# Patient Record
Sex: MALE | Race: WHITE | Employment: FULL TIME | URBAN - METROPOLITAN AREA
[De-identification: names, ages, dates, MRNs, and addresses within clinical notes are randomized per-mention and may not be internally consistent; named-entity substitution may affect disease eponyms.]

---

## 2019-08-21 ENCOUNTER — APPOINTMENT (OUTPATIENT)
Dept: GENERAL RADIOLOGY | Age: 24
End: 2019-08-21
Payer: COMMERCIAL

## 2019-08-21 ENCOUNTER — HOSPITAL ENCOUNTER (EMERGENCY)
Age: 24
Discharge: HOME | End: 2019-08-21
Attending: EMERGENCY MEDICINE
Payer: COMMERCIAL

## 2019-08-21 VITALS
SYSTOLIC BLOOD PRESSURE: 102 MMHG | OXYGEN SATURATION: 98 % | HEART RATE: 50 BPM | BODY MASS INDEX: 18.65 KG/M2 | WEIGHT: 126.3 LBS | DIASTOLIC BLOOD PRESSURE: 54 MMHG | TEMPERATURE: 98.2 F | RESPIRATION RATE: 16 BRPM

## 2019-08-21 DIAGNOSIS — R19.7 NAUSEA VOMITING AND DIARRHEA: Primary | ICD-10-CM

## 2019-08-21 DIAGNOSIS — R11.2 NAUSEA VOMITING AND DIARRHEA: Primary | ICD-10-CM

## 2019-08-21 DIAGNOSIS — S46.811A STRAIN OF RIGHT TRAPEZIUS MUSCLE, INITIAL ENCOUNTER: ICD-10-CM

## 2019-08-21 LAB
A/G RATIO: 1.9 (ref 1.1–2.2)
ALBUMIN SERPL-MCNC: 4.9 G/DL (ref 3.4–5)
ALP BLD-CCNC: 87 U/L (ref 40–129)
ALT SERPL-CCNC: 16 U/L (ref 10–40)
AMORPHOUS: ABNORMAL /HPF
ANION GAP SERPL CALCULATED.3IONS-SCNC: 10 MMOL/L (ref 3–16)
AST SERPL-CCNC: 16 U/L (ref 15–37)
BASOPHILS ABSOLUTE: 0.1 K/UL (ref 0–0.2)
BASOPHILS RELATIVE PERCENT: 1.2 %
BILIRUB SERPL-MCNC: 1 MG/DL (ref 0–1)
BILIRUBIN URINE: NEGATIVE
BLOOD, URINE: NEGATIVE
BUN BLDV-MCNC: 10 MG/DL (ref 7–20)
CALCIUM SERPL-MCNC: 9.5 MG/DL (ref 8.3–10.6)
CHLORIDE BLD-SCNC: 104 MMOL/L (ref 99–110)
CLARITY: CLEAR
CO2: 27 MMOL/L (ref 21–32)
COLOR: YELLOW
CREAT SERPL-MCNC: 0.7 MG/DL (ref 0.9–1.3)
EOSINOPHILS ABSOLUTE: 0.4 K/UL (ref 0–0.6)
EOSINOPHILS RELATIVE PERCENT: 5.5 %
EPITHELIAL CELLS, UA: ABNORMAL /HPF
GFR AFRICAN AMERICAN: >60
GFR NON-AFRICAN AMERICAN: >60
GLOBULIN: 2.6 G/DL
GLUCOSE BLD-MCNC: 111 MG/DL (ref 70–99)
GLUCOSE URINE: NEGATIVE MG/DL
HCT VFR BLD CALC: 44.8 % (ref 40.5–52.5)
HEMOGLOBIN: 15.2 G/DL (ref 13.5–17.5)
KETONES, URINE: NEGATIVE MG/DL
LEUKOCYTE ESTERASE, URINE: NEGATIVE
LIPASE: 15 U/L (ref 13–60)
LYMPHOCYTES ABSOLUTE: 1.3 K/UL (ref 1–5.1)
LYMPHOCYTES RELATIVE PERCENT: 18.7 %
MCH RBC QN AUTO: 29.1 PG (ref 26–34)
MCHC RBC AUTO-ENTMCNC: 33.8 G/DL (ref 31–36)
MCV RBC AUTO: 86.2 FL (ref 80–100)
MICROSCOPIC EXAMINATION: YES
MONOCYTES ABSOLUTE: 0.5 K/UL (ref 0–1.3)
MONOCYTES RELATIVE PERCENT: 7 %
MUCUS: ABNORMAL /LPF
NEUTROPHILS ABSOLUTE: 4.9 K/UL (ref 1.7–7.7)
NEUTROPHILS RELATIVE PERCENT: 67.6 %
NITRITE, URINE: NEGATIVE
PDW BLD-RTO: 13.6 % (ref 12.4–15.4)
PH UA: 7.5 (ref 5–8)
PLATELET # BLD: 168 K/UL (ref 135–450)
PMV BLD AUTO: 8.1 FL (ref 5–10.5)
POTASSIUM REFLEX MAGNESIUM: 4.1 MMOL/L (ref 3.5–5.1)
PROTEIN UA: ABNORMAL MG/DL
RBC # BLD: 5.2 M/UL (ref 4.2–5.9)
RBC UA: ABNORMAL /HPF (ref 0–2)
SODIUM BLD-SCNC: 141 MMOL/L (ref 136–145)
SPECIFIC GRAVITY UA: 1.01 (ref 1–1.03)
TOTAL PROTEIN: 7.5 G/DL (ref 6.4–8.2)
URINE TYPE: ABNORMAL
UROBILINOGEN, URINE: 2 E.U./DL
WBC # BLD: 7.2 K/UL (ref 4–11)
WBC UA: ABNORMAL /HPF (ref 0–5)

## 2019-08-21 PROCEDURE — 85025 COMPLETE CBC W/AUTO DIFF WBC: CPT

## 2019-08-21 PROCEDURE — 83690 ASSAY OF LIPASE: CPT

## 2019-08-21 PROCEDURE — 99284 EMERGENCY DEPT VISIT MOD MDM: CPT

## 2019-08-21 PROCEDURE — 73030 X-RAY EXAM OF SHOULDER: CPT

## 2019-08-21 PROCEDURE — 80053 COMPREHEN METABOLIC PANEL: CPT

## 2019-08-21 PROCEDURE — 6360000002 HC RX W HCPCS: Performed by: EMERGENCY MEDICINE

## 2019-08-21 PROCEDURE — 96361 HYDRATE IV INFUSION ADD-ON: CPT

## 2019-08-21 PROCEDURE — 96374 THER/PROPH/DIAG INJ IV PUSH: CPT

## 2019-08-21 PROCEDURE — 2580000003 HC RX 258: Performed by: EMERGENCY MEDICINE

## 2019-08-21 PROCEDURE — 81001 URINALYSIS AUTO W/SCOPE: CPT

## 2019-08-21 RX ORDER — METHOCARBAMOL 750 MG/1
750 TABLET, FILM COATED ORAL 3 TIMES DAILY PRN
Qty: 30 TABLET | Refills: 0 | Status: SHIPPED | OUTPATIENT
Start: 2019-08-21 | End: 2019-08-31

## 2019-08-21 RX ORDER — OMEGA-3 FATTY ACIDS/FISH OIL 300-1000MG
3 CAPSULE ORAL PRN
COMMUNITY
End: 2021-09-01

## 2019-08-21 RX ORDER — 0.9 % SODIUM CHLORIDE 0.9 %
1000 INTRAVENOUS SOLUTION INTRAVENOUS ONCE
Status: COMPLETED | OUTPATIENT
Start: 2019-08-21 | End: 2019-08-21

## 2019-08-21 RX ORDER — ONDANSETRON 4 MG/1
4 TABLET, ORALLY DISINTEGRATING ORAL EVERY 8 HOURS PRN
Qty: 12 TABLET | Refills: 1 | Status: SHIPPED | OUTPATIENT
Start: 2019-08-21 | End: 2021-04-14

## 2019-08-21 RX ORDER — ONDANSETRON 2 MG/ML
4 INJECTION INTRAMUSCULAR; INTRAVENOUS ONCE
Status: COMPLETED | OUTPATIENT
Start: 2019-08-21 | End: 2019-08-21

## 2019-08-21 RX ORDER — METHYLPREDNISOLONE 4 MG/1
TABLET ORAL
Qty: 1 KIT | Refills: 0 | Status: SHIPPED | OUTPATIENT
Start: 2019-08-21 | End: 2021-04-14

## 2019-08-21 RX ADMIN — SODIUM CHLORIDE 1000 ML: 9 INJECTION, SOLUTION INTRAVENOUS at 12:07

## 2019-08-21 RX ADMIN — ONDANSETRON 4 MG: 2 INJECTION INTRAMUSCULAR; INTRAVENOUS at 12:07

## 2019-08-21 ASSESSMENT — PAIN DESCRIPTION - PAIN TYPE: TYPE: ACUTE PAIN

## 2019-08-21 ASSESSMENT — PAIN SCALES - GENERAL: PAINLEVEL_OUTOF10: 6

## 2019-08-21 NOTE — ED PROVIDER NOTES
within normal limits    Narrative:     Performed at:  [facility]) - [facility]  [address],  [place]   Phone [phone]   CBC WITH AUTO DIFFERENTIAL    Narrative:     Performed at:  [facility]  [address],  [place]   Phone [phone]   LIPASE    Narrative:     Performed at:  [facility] - [facility]  [address],  [place]   Phone [phone]       ED COURSE & MEDICAL DECISION MAKING:  Pertinent Labs & Imaging studies reviewed. (See chart for details)  61-year-old male with 3-day history of nausea as well as some transient vomiting and diarrhea states his appetite is been decreased and he is worried that he is dehydrated. He works outside in the heat doing paving. Denies abdominal pain but does feel bloated at times. No urinary symptoms. He also complains of some right upper back pain for the last month it radiates into his right shoulder and sometimes into his neck. No known injury. IV fluids were started. He was given Zofran for nausea. CBC was normal.  CMP and lipase were normal.  Urinalysis was normal.  On reexam the patient is comfortable. Ambulates normally. No dizziness. Likely this is a viral illness. He was given a prescription for Zofran to take if needed for nausea and advised to push fluids. X-rays of the right shoulder were read as negative by the radiologist on duty and reviewed by myself. Clinically he has trapezius muscle pain. Less likely this represents a cervical radiculopathy but the patient is neurologically intact with no consistent radiation of the pain into the arm. I recommended ice to the area especially after working. He was given a Medrol Dosepak and Robaxin for muscle tightness. Recommended Tylenol if needed for pain. Advise follow-up with the [facility]. medical clinic.         I discussed with

## 2021-04-14 ENCOUNTER — HOSPITAL ENCOUNTER (EMERGENCY)
Age: 26
Discharge: HOME | End: 2021-04-15
Attending: EMERGENCY MEDICINE

## 2021-04-14 DIAGNOSIS — K04.7 DENTAL INFECTION: ICD-10-CM

## 2021-04-14 DIAGNOSIS — K02.9 DENTAL CAVITY: Primary | ICD-10-CM

## 2021-04-14 DIAGNOSIS — N50.812 LEFT TESTICULAR PAIN: ICD-10-CM

## 2021-04-14 LAB
ANION GAP SERPL CALCULATED.3IONS-SCNC: 8 MMOL/L (ref 3–16)
BASOPHILS ABSOLUTE: 0.1 K/UL (ref 0–0.2)
BASOPHILS RELATIVE PERCENT: 1.4 %
BILIRUBIN URINE: NEGATIVE
BLOOD, URINE: NEGATIVE
BUN BLDV-MCNC: 11 MG/DL (ref 7–20)
CALCIUM SERPL-MCNC: 9.2 MG/DL (ref 8.3–10.6)
CHLORIDE BLD-SCNC: 106 MMOL/L (ref 99–110)
CLARITY: CLEAR
CO2: 27 MMOL/L (ref 21–32)
COLOR: YELLOW
CREAT SERPL-MCNC: 0.8 MG/DL (ref 0.9–1.3)
EOSINOPHILS ABSOLUTE: 0.5 K/UL (ref 0–0.6)
EOSINOPHILS RELATIVE PERCENT: 7.1 %
GFR AFRICAN AMERICAN: >60
GFR NON-AFRICAN AMERICAN: >60
GLUCOSE BLD-MCNC: 113 MG/DL (ref 70–99)
GLUCOSE URINE: NEGATIVE MG/DL
HCT VFR BLD CALC: 41.3 % (ref 40.5–52.5)
HEMOGLOBIN: 14.1 G/DL (ref 13.5–17.5)
KETONES, URINE: NEGATIVE MG/DL
LEUKOCYTE ESTERASE, URINE: NEGATIVE
LYMPHOCYTES ABSOLUTE: 2.8 K/UL (ref 1–5.1)
LYMPHOCYTES RELATIVE PERCENT: 37.2 %
MCH RBC QN AUTO: 29.9 PG (ref 26–34)
MCHC RBC AUTO-ENTMCNC: 34.2 G/DL (ref 31–36)
MCV RBC AUTO: 87.5 FL (ref 80–100)
MICROSCOPIC EXAMINATION: NORMAL
MONOCYTES ABSOLUTE: 0.5 K/UL (ref 0–1.3)
MONOCYTES RELATIVE PERCENT: 6.5 %
NEUTROPHILS ABSOLUTE: 3.6 K/UL (ref 1.7–7.7)
NEUTROPHILS RELATIVE PERCENT: 47.8 %
NITRITE, URINE: NEGATIVE
PDW BLD-RTO: 13.1 % (ref 12.4–15.4)
PH UA: 6 (ref 5–8)
PLATELET # BLD: 168 K/UL (ref 135–450)
PMV BLD AUTO: 7.9 FL (ref 5–10.5)
POTASSIUM REFLEX MAGNESIUM: 3.8 MMOL/L (ref 3.5–5.1)
PROTEIN UA: NEGATIVE MG/DL
RBC # BLD: 4.72 M/UL (ref 4.2–5.9)
SODIUM BLD-SCNC: 141 MMOL/L (ref 136–145)
SPECIFIC GRAVITY UA: >=1.03 (ref 1–1.03)
URINE REFLEX TO CULTURE: NORMAL
URINE TRICHOMONAS EVALUATION: NORMAL
URINE TYPE: NORMAL
UROBILINOGEN, URINE: 1 E.U./DL
WBC # BLD: 7.6 K/UL (ref 4–11)

## 2021-04-14 PROCEDURE — 87591 N.GONORRHOEAE DNA AMP PROB: CPT

## 2021-04-14 PROCEDURE — 81001 URINALYSIS AUTO W/SCOPE: CPT

## 2021-04-14 PROCEDURE — 6360000002 HC RX W HCPCS: Performed by: EMERGENCY MEDICINE

## 2021-04-14 PROCEDURE — 87491 CHLMYD TRACH DNA AMP PROBE: CPT

## 2021-04-14 PROCEDURE — 99283 EMERGENCY DEPT VISIT LOW MDM: CPT

## 2021-04-14 PROCEDURE — 6370000000 HC RX 637 (ALT 250 FOR IP): Performed by: EMERGENCY MEDICINE

## 2021-04-14 PROCEDURE — 80048 BASIC METABOLIC PNL TOTAL CA: CPT

## 2021-04-14 PROCEDURE — 85025 COMPLETE CBC W/AUTO DIFF WBC: CPT

## 2021-04-14 PROCEDURE — 96372 THER/PROPH/DIAG INJ SC/IM: CPT

## 2021-04-14 RX ORDER — ONDANSETRON 4 MG/1
4 TABLET, ORALLY DISINTEGRATING ORAL ONCE
Status: COMPLETED | OUTPATIENT
Start: 2021-04-14 | End: 2021-04-14

## 2021-04-14 RX ORDER — KETOROLAC TROMETHAMINE 30 MG/ML
30 INJECTION, SOLUTION INTRAMUSCULAR; INTRAVENOUS ONCE
Status: COMPLETED | OUTPATIENT
Start: 2021-04-14 | End: 2021-04-14

## 2021-04-14 RX ORDER — CLINDAMYCIN HYDROCHLORIDE 300 MG/1
300 CAPSULE ORAL 4 TIMES DAILY
Qty: 28 CAPSULE | Refills: 0 | Status: SHIPPED | OUTPATIENT
Start: 2021-04-14 | End: 2021-04-21

## 2021-04-14 RX ADMIN — ONDANSETRON 4 MG: 4 TABLET, ORALLY DISINTEGRATING ORAL at 21:44

## 2021-04-14 RX ADMIN — KETOROLAC TROMETHAMINE 30 MG: 30 INJECTION, SOLUTION INTRAMUSCULAR at 21:44

## 2021-04-14 ASSESSMENT — PAIN SCALES - GENERAL: PAINLEVEL_OUTOF10: 7

## 2021-04-14 ASSESSMENT — ENCOUNTER SYMPTOMS
ABDOMINAL PAIN: 1
BACK PAIN: 1
EYE ITCHING: 0
EYES NEGATIVE: 1
NAUSEA: 1
DIARRHEA: 0
RESPIRATORY NEGATIVE: 1
VOMITING: 0
RHINORRHEA: 0
CONSTIPATION: 0
SHORTNESS OF BREATH: 0

## 2021-04-14 ASSESSMENT — PAIN DESCRIPTION - DESCRIPTORS: DESCRIPTORS: STABBING;ACHING

## 2021-04-14 NOTE — LETTER
[facility] Emergency Department  [facility] [address] [address]  Phone: [phone]  Fax: [phone]               April 15, 2021    Patient: Regan Jay   YOB: 1995   Date of Visit: 4/14/2021       To Whom It May Concern:    Lisa Dawkins was seen and treated in our emergency department on 4/14/2021. He may return to work on 4/16/21.       Sincerely,       Katy Collazo MD         Signature:__________________________________

## 2021-04-15 ENCOUNTER — APPOINTMENT (OUTPATIENT)
Dept: ULTRASOUND IMAGING | Age: 26
End: 2021-04-15

## 2021-04-15 VITALS
HEART RATE: 72 BPM | TEMPERATURE: 98.2 F | OXYGEN SATURATION: 100 % | WEIGHT: 139.1 LBS | RESPIRATION RATE: 14 BRPM | SYSTOLIC BLOOD PRESSURE: 111 MMHG | HEIGHT: 69 IN | BODY MASS INDEX: 20.6 KG/M2 | DIASTOLIC BLOOD PRESSURE: 50 MMHG

## 2021-04-15 LAB
C. TRACHOMATIS DNA ,URINE: NEGATIVE
N. GONORRHOEAE DNA, URINE: NEGATIVE

## 2021-04-15 PROCEDURE — 93975 VASCULAR STUDY: CPT

## 2021-04-15 PROCEDURE — 76870 US EXAM SCROTUM: CPT

## 2021-04-15 NOTE — ED NOTES
Pt to [facility] ED per pvt car informed to remain NPO and go directly there.      Vicki Mckeon RN  04/14/21 2430

## 2021-04-15 NOTE — ED NOTES
Patient discharged to home via family. Written discharge instructions reviewed with understanding. Copy of AVS and signed prescription sent home with patient. Patient able to walk from ED without assistance.        Charlotte López RN  04/15/21 2505

## 2021-04-15 NOTE — ED PROVIDER NOTES
[address]      Pt Name: Garry Carolina  MRN: [id number]  [place] 1995  Date of evaluation: 4/14/2021  Provider: Huang Lundberg MD    CHIEF COMPLAINT       Chief Complaint   Patient presents with    Abdominal Pain     stomach pain left lower abd  ( has testicle \"L\" issues) nausea in mornings         HISTORY OF PRESENT ILLNESS   (Location/Symptom, Timing/Onset,Context/Setting, Quality, Duration, Modifying Factors, Severity)  Note limiting factors. Garry Carolina is a 26 y.o. male who presents to the emergency department for sharp left lower quadrant abdominal pain and also left testicular pain. His symptoms have been ongoing for several years intermittently however over the past few weeks his symptoms have worsened resulting in severe left testicular pain today. When the pain gets severe it is associated of nausea although he is not vomited. Patient also complaining of dental pain. Nursing notes were reviewed. REVIEW OF SYSTEMS    (2-9 systems for level 4, 10 or more for level 5)     Review of Systems   Constitutional:         temp   HENT: Positive for dental problem. Negative for rhinorrhea. Eyes: Negative for itching. Respiratory: Negative for shortness of breath. Cardiovascular: Negative for chest pain. Gastrointestinal: Positive for abdominal pain. Genitourinary: Positive for frequency and testicular pain. Negative for discharge, dysuria and hematuria. Incomplete voiding   Musculoskeletal: Positive for back pain. Skin: Negative for rash. Neurological: Negative for headaches. Hematological: Does not bruise/bleed easily. PAST MEDICAL HISTORY   History reviewed. No pertinent past medical history.       SURGICALHISTORY       Past Surgical History:   Procedure Laterality Date    FRACTURE SURGERY      nasal fracture    HAND SURGERY      left    WISDOM TOOTH EXTRACTION           CURRENT MEDICATIONS       Previous Ear: External ear normal.      Left Ear: External ear normal.      Nose: Nose normal.      Mouth/Throat:      Comments: Left upper molar indicating surrounding tenderness. No obvious abscess collection. The gum is slightly edematous. Eyes:      General: No scleral icterus. Right eye: No discharge. Left eye: No discharge. Conjunctiva/sclera: Conjunctivae normal.   Neck:      Musculoskeletal: Neck supple. Cardiovascular:      Rate and Rhythm: Normal rate and regular rhythm. Heart sounds: Normal heart sounds. Pulmonary:      Effort: Pulmonary effort is normal. No respiratory distress. Breath sounds: Normal breath sounds. No wheezing or rales. Abdominal:      General: Bowel sounds are normal.      Hernia: No hernia is present. Genitourinary:     Comments: Left testicular tenderness, tenderness along the spermatic cord, no obvious hernias, no obvious swelling. Slightly diminished cremasterics reflex on the left when compared to the right. No obvious abnormality of the testicle. Skin:     Coloration: Skin is not pale. Neurological:      Mental Status: He is alert.    Psychiatric:         Mood and Affect: Mood normal.         Behavior: Behavior normal.             DIAGNOSTIC RESULTS     EKG: All EKG's are interpreted by the Emergency Department Physician who either signs or Co-signs this chart in the absence of a cardiologist.    12 lead EKG shows   RADIOLOGY:   Non-plain film images such as CT, Ultrasound and MRI are read by the radiologist. Plain radiographic images are visualized and preliminarily interpreted by the emergency physician with the below findings:        Interpretation per the Radiologist below, if available at the time of this note:    [address]    (Results Pending)         ED BEDSIDE ULTRASOUND:   Performed by ED Physician - none    LABS:  Labs Reviewed   BASIC METABOLIC PANEL W/ REFLEX TO MG FOR LOW K - Abnormal; Notable for the following components:       Result Value    Glucose 113 (*)     CREATININE 0.8 (*)     All other components within normal limits    Narrative:     Performed at:  [facility]  [address],  [place] [facility]   Phone [phone]   C. TRACHOMATIS N.GONORRHOEAE DNA, URINE   CBC WITH AUTO DIFFERENTIAL    Narrative:     Performed at:  [facility]  [facility]  [place] [address] [facility]   Phone [phone]   URINE RT REFLEX TO CULTURE    Narrative:     Performed at:  [facility]  [facility],  [place] [facility]   Phone [phone]   URINE TRICHOMONAS EVALUATION    Narrative:     Performed at:  [facility]  [address] [facility]   Phone [phone]       All other labs were within normal range or not returned as of this dictation. EMERGENCY DEPARTMENT COURSE and DIFFERENTIAL DIAGNOSIS/MDM:   Vitals:    Vitals:    04/14/21 2035   BP: (!) 111/50   Resp: 14   Temp: 98.2 °F (36.8 °C)   TempSrc: Oral   SpO2: 100%   Weight: 139 lb 1.6 oz (63.1 kg)   Height: 5' 9\" (1.753 m)       Adult male who comes in for left lower quadrant abdominal pain however on exam the patient does not have any abdominal tenderness no CVA tenderness he is pain is localized to the left spermatic cord testicle epididymal region. No obvious swelling or deformity. No redness. Patient has had issues with his testicle in the past.  I reviewed the patient's chart he has had 2 nonspecific ultrasounds one was normal and one had what appeared to be calcification. Basic laboratory studies are obtained here. I believe that the patient requires an ultrasound of his testicle. I spoke to [facility] and Dr. Vinayak Wren has agreed to accept this patient. Counseling is provided the patient will go via private vehicle.   He is given medication for pain here prior to discharge. A prescription for antibiotics prescribed for his dental pain and I recommend that he follows up with a dentist.               CRITICAL CARE TIME   None       CONSULTS:  None    PROCEDURES:       Procedures    FINAL IMPRESSION      1. Dental cavity    2. Left testicular pain    3. Dental infection          DISPOSITION/PLAN   DISPOSITION Decision To Transfer 04/14/2021 09:31:37 PM      PATIENT REFERREDTO:  No follow-up provider specified.     DISCHARGEMEDICATIONS:  New Prescriptions    CLINDAMYCIN (CLEOCIN) 300 MG CAPSULE    Take 1 capsule by mouth 4 times daily for 7 days          (Please note that portions of this note were completed with a voice recognition program.  Efforts were made to edit the dictations but occasionally words are mis-transcribed.)    Dragan Denton MD (electronically signed)  Attending Emergency Physician        Dragan Denton MD  04/14/21 0930

## 2021-04-15 NOTE — ED NOTES
Report called to [facility] ED RN CN and informed pt will arrive by pvt care.       Elizabeth Lowery RN  04/14/21 2747

## 2021-04-15 NOTE — ED PROVIDER NOTES
used smokeless tobacco. He reports that he does not drink alcohol or use drugs. Medications     Previous Medications    IBUPROFEN (ADVIL) 200 MG CAPS    Take 3 capsules by mouth as needed for Fever       Allergies     He is allergic to penicillins. Physical Exam     INITIAL VITALS: BP: (!) 111/50, Temp: 98.2 °F (36.8 °C),  , Resp: 14, SpO2: 100 %   Physical Exam  Vitals signs and nursing note reviewed. Constitutional:       General: He is not in acute distress. Abdominal:      General: Bowel sounds are normal.      Palpations: Abdomen is soft. Tenderness: There is no abdominal tenderness. There is no guarding or rebound. Hernia: There is no hernia in the left inguinal area. Genitourinary:     Testes:         Left: Tenderness present. Swelling not present. Cremasteric reflex is present. Epididymis:      Left: Normal.      Comments: Tenderness to palpation of the left testicle which is normal in lie. No epididymal swelling or tenderness noted. Lymphadenopathy:      Lower Body: No left inguinal adenopathy. Neurological:      Mental Status: He is alert. Diagnostic Results     RADIOLOGY:  US SCROTUM AND TESTICLES   Final Result     Normal scrotal ultrasound. No torsion. No intratesticular mass. US DUP ABD PEL RETRO SCROT COMPLETE   Final Result   Normal scrotal ultrasound. No torsion. No intratesticular mass.           LABS:   Results for orders placed or performed during the hospital encounter of 04/14/21   CBC Auto Differential   Result Value Ref Range    WBC 7.6 4.0 - 11.0 K/uL    RBC 4.72 4.20 - 5.90 M/uL    Hemoglobin 14.1 13.5 - 17.5 g/dL    Hematocrit 41.3 40.5 - 52.5 %    MCV 87.5 80.0 - 100.0 fL    MCH 29.9 26.0 - 34.0 pg    MCHC 34.2 31.0 - 36.0 g/dL    RDW 13.1 12.4 - 15.4 %    Platelets 663 912 - 481 K/uL    MPV 7.9 5.0 - 10.5 fL    Neutrophils % 47.8 %    Lymphocytes % 37.2 %    Monocytes % 6.5 %    Eosinophils % 7.1 %    Basophils % 1.4 %    Neutrophils DECISIONMAKING / ASSESSMENT / PLAN     Edmundo Choe is a 26 y.o. male presents complaining of abdominal pain and testicular pain. Patient reports testicular pain that has been chronic in nature though worse over the last week. He does have left testicular tenderness on exam.  Patient was seen initially at an outside hospital and transferred here for evaluation. Laboratory studies to the outside hospital were unremarkable. Ultrasound here shows no evidence of torsion or other abnormalities. Patient will be instructed do supportive care for pain and follow-up with urology. He was written for antibiotics at the outside facility and will be instructed to continue this and follow-up with a dental clinic of his choice. Clinical Impression     1. Dental cavity    2. Left testicular pain    3.  Dental infection        Disposition     PATIENT REFERRED TO:  Primary care provider of your choice          [facility]          Titi Young MD  [place] [address]  [facility] [address] [place] [address]  [phone]            DISCHARGE MEDICATIONS:  New Prescriptions    CLINDAMYCIN (CLEOCIN) 300 MG CAPSULE    Take 1 capsule by mouth 4 times daily for 7 days       DISPOSITION Decision To Discharge 04/15/2021 12:41:11 AM        Chela Menendez MD  04/15/21 0849

## 2021-09-01 ENCOUNTER — HOSPITAL ENCOUNTER (EMERGENCY)
Age: 26
Discharge: HOME | End: 2021-09-01
Attending: EMERGENCY MEDICINE

## 2021-09-01 VITALS
BODY MASS INDEX: 19.28 KG/M2 | SYSTOLIC BLOOD PRESSURE: 109 MMHG | TEMPERATURE: 97.8 F | WEIGHT: 130.2 LBS | RESPIRATION RATE: 12 BRPM | HEIGHT: 69 IN | HEART RATE: 56 BPM | OXYGEN SATURATION: 98 % | DIASTOLIC BLOOD PRESSURE: 54 MMHG

## 2021-09-01 DIAGNOSIS — K08.89 PAIN, DENTAL: Primary | ICD-10-CM

## 2021-09-01 DIAGNOSIS — K02.9 PAIN DUE TO DENTAL CARIES: ICD-10-CM

## 2021-09-01 PROCEDURE — 99284 EMERGENCY DEPT VISIT MOD MDM: CPT

## 2021-09-01 RX ORDER — METRONIDAZOLE 500 MG/1
500 TABLET ORAL 2 TIMES DAILY
Qty: 14 TABLET | Refills: 0 | Status: SHIPPED | OUTPATIENT
Start: 2021-09-01 | End: 2021-09-08

## 2021-09-01 RX ORDER — IBUPROFEN 600 MG/1
600 TABLET ORAL EVERY 6 HOURS PRN
Qty: 40 TABLET | Refills: 0 | Status: SHIPPED | OUTPATIENT
Start: 2021-09-01

## 2021-09-01 ASSESSMENT — PAIN DESCRIPTION - LOCATION
LOCATION: TEETH
LOCATION: TEETH

## 2021-09-01 ASSESSMENT — PAIN DESCRIPTION - PAIN TYPE
TYPE: ACUTE PAIN
TYPE: ACUTE PAIN

## 2021-09-01 ASSESSMENT — PAIN SCALES - GENERAL
PAINLEVEL_OUTOF10: 6
PAINLEVEL_OUTOF10: 7

## 2021-09-01 ASSESSMENT — PAIN DESCRIPTION - FREQUENCY: FREQUENCY: CONTINUOUS

## 2021-09-01 ASSESSMENT — PAIN DESCRIPTION - DESCRIPTORS
DESCRIPTORS: ACHING
DESCRIPTORS: ACHING

## 2021-09-01 NOTE — ED NOTES
Patient to ed with complaints of dental issues he has had for 2 years, patient reports pain has increased the past few days.      Christine Marr RN  09/01/21 3303

## 2021-09-01 NOTE — ED PROVIDER NOTES
CHIEF COMPLAINT  Dental Pain      HISTORY OF PRESENT ILLNESS  Elena Guido  is a 23 y.o. male who presents to the ED at via private vehicle complaining of dental pain. Patient states that he has a history of dental caries/dental fracture involving the left upper posterior molar. Patient states that he has noted increasing pain and mild swelling over the site. Patient denies fevers, chills, sweats. Pain is rated as moderate. There are no other complaints, modifying factors or associated symptoms. Nursing notes reviewed. Past medical history:  has no past medical history on file. Past surgical history:  has a past surgical history that includes [place] tooth extraction; fracture surgery; and Hand surgery. Home medications:   Prior to Admission medications    Medication Sig Start Date End Date Taking? Authorizing Provider   ibuprofen (IBU) 600 MG tablet Take 1 tablet by mouth every 6 hours as needed for Pain 9/1/21  Yes Nereida Sandhoff, DO   metroNIDAZOLE (FLAGYL) 500 MG tablet Take 1 tablet by mouth 2 times daily for 7 days 9/1/21 9/8/21 Yes Nereida Sandhoff, DO       Allergies   Allergen Reactions    Penicillins Hives       Social history:  reports that he has been smoking cigarettes. He has been smoking about 1.00 pack per day. He has never used smokeless tobacco. He reports that he does not drink alcohol and does not use drugs. Family history:  History reviewed. No pertinent family history. REVIEW OF SYSTEMS  6 systems reviewed, pertinent positives per HPI otherwise noted to be negative    PHYSICAL EXAM  Vitals:    09/01/21 0900   BP: (!) 109/54   Pulse: 56   Resp: 12   Temp: 97.8 °F (36.6 °C)   SpO2: 98%       GENERAL: Patient is well-developed, well-nourished,  no acute distress. Moderate apparent discomfort. Non toxic appearing. HEENT:  Normocephalic, atraumatic. PERRL. Conjunctiva appear normal.  External ears are normal.  MMM.   Left upper posterior molar is necrotic to the gumline. Moderate tenderness without evidence of drainable abscess. No obvious midface tenderness or swelling noted. NECK: Supple with normal ROM. Trachea midline  LUNGS:  Normal work of breathing. Speaking comfortably in full sentences. EXTREMITIES: 2+ distal pulses w/o edema. MUSCULOSKELETAL:  Atraumatic extremities with normal ROM grossly. No obvious bony deformities. SKIN: Warm/dry. No rashes/lesions noted. PSYCHIATRIC: Patient is alert and oriented with normal affect  NEUROLOGIC: Cranial nerves grossly intact. Moves all extremities with equal strength. No gross sensory deficits. Answers questions/follows commands appropriately. ED COURSE/MDM  Nursing notes reviewed. Pt was given the following medications or treatments in the ED: none    Clinical Impression  Based on the presenting complaint, history, and physical exam, multiple diagnoses were considered. Exam and workup here most c/w:  1. Pain, dental    2. Pain due to dental caries        I discussed with Mariama Hong the results of evaluation in the ED, diagnosis, care, and prognosis. The plan is to discharge to home. Patient is in agreement with plan and questions have been answered. I also discussed with Mariama Hong the reasons which may require a return visit and the importance of follow-up care. The patient is well-appearing, nontoxic, and improved at the time of discharge. Patient agrees to call to arrange follow-up care as directed. Mariama Hong understands to return immediately for worsening/change in symptoms.       Patient will be started on the following medications from the ED:  Discharge Medication List as of 9/1/2021  9:38 AM      START taking these medications    Details   ibuprofen (IBU) 600 MG tablet Take 1 tablet by mouth every 6 hours as needed for Pain, Disp-40 tablet, R-0Normal      metroNIDAZOLE (FLAGYL) 500 MG tablet Take 1 tablet by mouth 2 times daily for 7 days, Disp-14 tablet, R-0Normal Disposition  Pt is discharged in stable condition.     Disposition Vitals:  BP (!) 109/54   Pulse 56   Temp 97.8 °F (36.6 °C) (Oral)   Resp 12   Ht 5' 9\" (1.753 m)   Wt 130 lb 3.2 oz (59.1 kg)   SpO2 98%   BMI 19.23 kg/m²                     Beth Juarez, DO  09/01/21 9963

## 2021-09-01 NOTE — LETTER
[address] Emergency Department  [place] [address]  Phone: [phone]  Fax: [phone]               September 1, 2021    Patient: Adan Barclay   YOB: 1995   Date of Visit: 9/1/2021       To Whom It May Concern:    Jamin Smith was seen and treated in our emergency department on 9/1/2021. He may return to work.       Sincerely,               Signature:__________________________________

## 2022-12-18 ENCOUNTER — HOSPITAL ENCOUNTER (EMERGENCY)
Age: 27
Discharge: HOME | End: 2022-12-18
Attending: EMERGENCY MEDICINE

## 2022-12-18 VITALS
DIASTOLIC BLOOD PRESSURE: 47 MMHG | RESPIRATION RATE: 17 BRPM | HEIGHT: 69 IN | OXYGEN SATURATION: 98 % | HEART RATE: 87 BPM | BODY MASS INDEX: 19.67 KG/M2 | TEMPERATURE: 98 F | WEIGHT: 132.8 LBS | SYSTOLIC BLOOD PRESSURE: 129 MMHG

## 2022-12-18 DIAGNOSIS — K02.9 DENTAL CARIES: Primary | ICD-10-CM

## 2022-12-18 PROCEDURE — 99283 EMERGENCY DEPT VISIT LOW MDM: CPT

## 2022-12-18 PROCEDURE — 6370000000 HC RX 637 (ALT 250 FOR IP): Performed by: EMERGENCY MEDICINE

## 2022-12-18 RX ORDER — CLINDAMYCIN HYDROCHLORIDE 300 MG/1
300 CAPSULE ORAL 4 TIMES DAILY
Qty: 28 CAPSULE | Refills: 0 | Status: SHIPPED | OUTPATIENT
Start: 2022-12-18 | End: 2022-12-25

## 2022-12-18 RX ORDER — IBUPROFEN 600 MG/1
600 TABLET ORAL ONCE
Status: COMPLETED | OUTPATIENT
Start: 2022-12-18 | End: 2022-12-18

## 2022-12-18 RX ORDER — CHLORHEXIDINE GLUCONATE 0.12 MG/ML
15 RINSE ORAL 3 TIMES DAILY
Qty: 473 ML | Refills: 0 | Status: SHIPPED | OUTPATIENT
Start: 2022-12-18 | End: 2023-01-01

## 2022-12-18 RX ADMIN — IBUPROFEN 600 MG: 600 TABLET, FILM COATED ORAL at 13:43

## 2022-12-18 ASSESSMENT — PAIN - FUNCTIONAL ASSESSMENT: PAIN_FUNCTIONAL_ASSESSMENT: 0-10

## 2022-12-18 ASSESSMENT — PAIN SCALES - GENERAL: PAINLEVEL_OUTOF10: 7

## 2022-12-18 NOTE — DISCHARGE INSTRUCTIONS
Take the entire course of antibiotics as prescribed, even if you begin to feel much better before the bottle is empty. Take ibuprofen, up to 600 mg four times per day every day with food for the next 3-5 days, then as needed and directed on the bottle for continued pain. You may alternate this with up to 1000 mg of acetaminophen (Tylenol), every six hours. Do not take more than 4000 mg of acetaminophen from all sources in a 24-hour period. Please call your dentist, or use the list of dental clinics below as soon as possible to arrange a follow-up appointment. Call your doctor or dentist, or return to the emergency department, if you have increasing pain, spreading swelling or redness, particularly if the swelling affects your eye, or causes any difficulty swallowing or breathing, or if you have fever greater than 101°F, vomiting, or other concerning symptoms. CONTACT A CAREGIVER IF:   You have a fever (increased body temperature). You have an injury that causes a crack in your tooth. Your skin becomes itchy, swollen, or develops a rash after taking your medicine. You have questions or concerns about your condition, care, or treatment. SEEK CARE IMMEDIATELY IF:   You get new symptoms or old symptoms return after you have been treated. You have bleeding from your mouth that does not stop. You have trouble breathing or speaking. You have trouble or pain with swallowing or cannot eat or drink. Your face suddenly becomes swollen or the swelling increases. You have difficulty opening your mouth. Your pain gets worse. If you have Medicaid Insurance: Your health plan can help you make a next day or same day dental appointment. The cost of this appointment is covered by your regular health plan benefits! Please call the below number for your health plan during regular business hours to make a dental appointment.       Dennis Myles [address]     [phone]  [place]     [phone]    If you have trouble getting services through your Medicaid provider, please feel free to call the Medicaid Hotline at 564-046-7409852.423.3091. 532 [facility]  Resources:     [place] [address]  [phone]   Monday 9a. m. - 5:30p.m.; Tuesday - Friday  7:30a.m. - 5p.m. Saturday Emergency Only   Emergency: Monday (Arrive by 8:30am), Tuesday- Friday (Arrive by 7:30am)  Must be a resident of the [place] [address]; Bring proof of this residence (example: utility bill); Sliding Fee Scale  *Scale requires proof of income (example: pay stub or tax return). Scale is based on family size and income. Discounts can be 25%, 50%, 75%, or 100% of all services. Minimum Co-pay must be $20  Medicaid, Insurance, 200 [facility]  [place] [address], 1612 [facility]  [phone]   Monday-Friday 7:30a.m. - 5p.m. Emergencies Monday-Thursday (arrive by 7:30)  Must be a resident of the [place] [address]; Bring proof of this residence (example: utility bill); Sliding Fee Scale  *Scale requires proof of income (example: pay stub or tax return). Scale is based on family size and income. Discounts can be 25%, 50%, 75%, or 100% of all services. Minimum Co-pay must be $20  Medicaid, Insurance, [address] 13  [phone]  Monday-Thursday  7:00 a.m. - 5p.m. Friday 7:00 am- 1:00 pm  Emergencies Wednesday and Thursday only at 7am  Must be a resident of the [place] [address]; Bring proof of this residence (example: utility bill); Sliding Fee Scale  *Scale requires proof of income (example: pay stub or tax return). Scale is based on family size and income. Discounts can be 25%, 50%, 75%, or 100% of all services.    Minimum Co-pay must be [facility]  Medicaid, Insurance, [address] [place], [address]  [phone]   Monday -Thursday  6:30a.m. - 8p.m. Friday  6:30a.m. - 5p.m. Emergencies Monday-Thursday Every morning at 6:30am & Every afternoon at 1:30pm   Must be a resident of the [place] [address]; Bring proof of this residence (example: utility bill); Sliding Fee Scale  *Scale requires proof of income (example: pay stub or tax return). Scale is based on family size and income. Discounts can be 25%, 50%, 75%, or 100% of all services. Minimum Co-pay must be $20  Medicaid, Insurance, [address] 14 [facility], [address]  [phone]   Monday, Tuesday, Thursday, & Friday 8:30am-5pm   No residency requirements Minimum visit fee: $35.00  Sliding Fee Scale  Accepts Medicaid, Insurance, [address] [facility] [address]  1201 [facility],[address], 1100 [facility]  [phone]   Monday-Thursday 8am-5pm Friday 8am-2pm   No residency requirements   Covers patients up to age 1 Medical [facility] , Insurance, [address], [address]  [phone]  Monday-Friday 9:30am-5:30pm No residency requirements   Accepts Medicaid, Insurance, Self-Pay    Small [facility]  350 [facility].  [address]leg[address] Kathleen  [phone]   Monday-Friday 8am-5pm Appointment Only No residency requirements  Accepts Medicaid, Insurance, Self-Pay  Small [facility]  [phone]   Monday 8am-7pm; Tuesday-Friday 8am-5pm Appointment Only   No residency requirements  Accepts Medicaid, Insurance, 178 [facility]  100 [facility]ite [facility] 1, [address]  [phone]  Monday & Wednesday 8:00am - 4:30pm  Tuesday 8:30am - 4:30  Thursday 8:00am - 7:00 pm Friday 8:00am - 2:00pm   Appointment Only No residency requirements   Sliding Fee Scale  *Scale requires proof of income (example: pay stub or tax return). Scale is based on family size and income. Discounts can be 25%, 50%, 75%, or 100% of all services. Accepts Medicaid, Insurance, [address]  320 [facility], [address] [place], [address]  [phone]   Monday- Thursday 7:30a.m. - 4:30p.m., Friday 7:30am-4:00pm   Must have a homeless certification to be seen. Restricted to only homeless patients  Free services    [address]  [address]  [phone]   FALL: Tuesday and Thursday 8:30am-pm; Wednesday 1:30pm-5pm  SPRING- Tuesday, Wednesday  & Thursday 8:30am-5pm   Hygiene Only   No residency requirements  Do Not Accept Insurance--Only Self-Pay. Fees for Preventative dentistry: Age 13 and under $15.00; Ages 12 to 61 $20.00; Age 61 and over $10.00; No Teeth (dentures) $5.00    [facility]   [address]  [phone]   Monday-Friday 7am-5pm Appointment Only and requires a referral from dentist   No residency requirements; require a referral from dentist   Accepts Medicaid, Insurance and 4200 [facility] 106, 1100 [facility]  [phone] Monday - Friday 7:30a.m. - 5p.m. Emergencies Monday-Thursday  Must be at facility at 7:30am for emergency care   Must be a resident of the [place] [address]; Bring proof of this residence (example: utility bill); Sliding Fee Scale  *Scale requires proof of income (example: pay stub or tax return). Scale is based on family size and income. Discounts can be 25%, 50%, 75%, or 100% of all services.    Minimum Co-pay must be [facility]  Medicaid, Insurance, [facility], 3[address] [facility] [address], [facility] [address]  [phone]   Monday 8:30am-7pm; Tuesday & Wednesday 8am-5pm; Thursday 8am-7pm; Friday 8: 30am-4pm Appointment Only   No residency requirements   Sliding Fee Scale  *Scale requires proof of income (example: pay stub or tax return). Scale is based on family size and income. Discounts can be 25%, 50%, 75%, or 100% of all services. Accepts Medicaid, Insurance, 3015 [facility], [address] [facility]  [address] [facility] [address] [facility], 6300 [facility]  [phone]  Monday 8am-7pm; Tuesday 8am-6pm; Wednesday 8am-5pm; Thursday 8am-7:30pm; Friday 7:00pm-4pm  No residency requirements   Sliding Fee Scale  *Scale requires proof of income (example: pay stub or tax return). Scale is based on family size and income. Discounts can be 25%, 50%, 75%, or 100% of all services. Accepts Medicaid, Insurance, Alejandrina Lopez [facility] 15., [facility]  [address]  [phone]   Monday-Thursday 7:30am-4pm; Friday 7:30am-1pm Appointment Only   No residency requirements; covers patients up to age 15   Accepts Medicaid, Insurance, [address]  [address] [place] [address]  [phone]   Monday, Wednesday & Thursday 8a. m. to 5p.m.; Tuesday 8a. m. to 7p.m. Friday 8a. m. to 1p.m. Must be a resident of [facility]   Sliding Fee Scale  *Scale requires proof of income (example: pay stub or tax return). Scale is based on family size and income. Discounts can be 25%, 50%, 75%, or 100% of all services. Accepts Medicaid, Insurance, 4250 [facility] [address]  [phone].  [phone]   Monday-Thursday 8am - 4pm Friday 8am-3pm   Appointment Only   No residency requirements;  Sees only children up to the age of [address], Insurance, 1467 [facility] [address] 99. [address] [place], [address] [facility]  [phone]   Monday-Thursday 7:30am-7:30pm Friday 7:30am-5pm; Saturday 8:30-12pm   Appointment Only No residency requirements   Sliding Fee Scale  *Scale requires proof of income (example: pay stub or tax return). Scale is based on family size and income. Discounts can be 25%, 50%, 75%, or 100% of all services. Medicaid, Insurance, [address], [address]  1265 [facility], [address] [place] [address] [address]  [phone] 10:30am - 7:00pm Friday 8:30am - 5:00pm   Appointment Only No residency requirements   Sliding Fee Scale  *Scale requires proof of income (example: pay stub or tax return). Scale is based on family size and income. Discounts can be 25%, 50%, 75%, or 100% of all services. Medicaid, [address], [address] [facility] [address]  [address] [facility], [address]  [phone]    Monday-Thursday 10:30am - 7:00pm Friday 8:30am - 5:00pm   Appointment Only   No residency requirements   Sliding Fee Scale  *Scale requires proof of income (example: pay stub or tax return). Scale is based on family size and income. Discounts can be 25%, 50%, 75%, or 100% of all services.    Medicaid, Self-pay

## 2022-12-18 NOTE — ED PROVIDER NOTES
[address] COMPLAINT  Dental Pain (Multiple broken teeth, states upper right is the \"worst\" right now feels like the right side is swollen)       HISTORY OF PRESENT ILLNESS  Maddy Walsh is a 33 y.o. male who presents to the ED complaining of dental pain. The patient reports the onset of symptoms about 1.5 months ago. Pain is most severe at the right maxillary and mandibular molars. Also with some pain around the left maxillary molars. Subjective fever yesterday but didn't measure a temp. Denies chest pain, shortness of breath. Nausea and vomiting early this morning. No diarrhea. Denies trismus, difficulty swallowing. No other complaints, modifying factors or associated symptoms. I have reviewed the following from the nursing documentation:    History reviewed. No pertinent past medical history. Past Surgical History:   Procedure Laterality Date    FRACTURE SURGERY      nasal fracture    HAND SURGERY      left    WISDOM TOOTH EXTRACTION       History reviewed. No pertinent family history.   Social History     Socioeconomic History    Marital status: Single     Spouse name: Not on file    Number of children: Not on file    Years of education: Not on file    Highest education level: Not on file   Occupational History    Not on file   Tobacco Use    Smoking status: Every Day     Packs/day: 1.00     Types: Cigarettes    Smokeless tobacco: Never   Substance and Sexual Activity    Alcohol use: No    Drug use: No    Sexual activity: Not Currently     Partners: Female   Other Topics Concern    Not on file   Social History Narrative    Not on file     Social Determinants of Health     Financial Resource Strain: Not on file   Food Insecurity: Not on file   Transportation Needs: Not on file   Physical Activity: Not on file   Stress: Not on file   Social Connections: Not on file   Intimate Partner Violence: Not on file   Housing Stability: Not on file     No current facility-administered medications for this encounter. Current Outpatient Medications   Medication Sig Dispense Refill    clindamycin (CLEOCIN) 300 MG capsule Take 1 capsule by mouth 4 times daily for 7 days 28 capsule 0    chlorhexidine (PERIDEX) 0.12 % solution Take 15 mLs by mouth 3 times daily for 14 days 473 mL 0    ibuprofen (IBU) 600 MG tablet Take 1 tablet by mouth every 6 hours as needed for Pain 40 tablet 0     Allergies   Allergen Reactions    Penicillins Hives       REVIEW OF SYSTEMS  10 systems reviewed, pertinent positives and negatives per HPI, otherwise noted to be negative. PHYSICAL EXAM  ED Triage Vitals [12/18/22 1326]   Enc Vitals Group      BP (!) 129/47      Heart Rate 87      Resp 17      Temp 98 °F (36.7 °C)      Temp Source Oral      SpO2 98 %      Weight 132 lb 12.8 oz (60.2 kg)      Height 5' 9\" (1.753 m)      Head Circumference       Peak Flow       Pain Score       Pain Loc       Pain Edu? Excl. in [address]? General appearance: Awake and alert. Cooperative. No acute distress. Nontoxic. HENT: Normocephalic. Atraumatic. Mucous membranes are moist. No trismus. No induration at floor of mouth. No facial edema. There is tenderness at multiple bilateral maxillary and mandibular molars. No periapical abscess noted. Neck: Supple. No meningismus. Eyes: PERRL. EOMI. Heart/Chest: RRR. No murmurs. Lungs: Respirations unlabored. CTAB. Good air exchange. Speaking comfortably in full sentences. Abdomen: No tenderness. Soft. Non-distended. No guarding or rebound. Musculoskeletal: No extremity edema. No deformity. Skin: Warm and dry. No acute rashes. Neurological: Alert and oriented. CN II-XII intact. Gait normal.  Psychiatric: Normal mood and affect. LABS  I have reviewed all labs for this visit. No results found for this visit on 12/18/22. RADIOLOGY  I have reviewed all radiographic studies for this visit.    No orders to display        ED COURSE/MDM  Patient seen and evaluated. Old records reviewed. Labs and imaging reviewed and results discussed with patient.      33 y.o. male presented with dental pain. On arrival, the patient is afebrile and nontoxic in appearance. Exam is notable for poor dentition, multiple dental carries. There is no induration at the floor of the mouth and I am not concerned for Kade's angina. There is no cheek swelling and I am not concerned for canine space infection. No periapical abscesses are noted. The plan is discharge to home with clindamycin and Peridex for prophylaxis and close follow up with a  dentist or oral surgeon for further evaluation/treatment/definitive care of affected tooth. NSAIDs for pain. Usual strict return precautions for new or worsening symptoms communicated. All questions were answered and the patient/family expressed understanding and agreement with the plan. During the patient's ED course, the patient was given:  Medications   ibuprofen (ADVIL;MOTRIN) tablet 600 mg (600 mg Oral Given 12/18/22 1343)        CLINICAL IMPRESSION  1. Dental caries        DISPOSITION   Decision To Discharge 12/18/2022 01:45:13 PM    Condition: stable    Zora Simmonds, MD    Note: This chart was created using voice recognition dictation software. Efforts were made by me to ensure accuracy, however some errors may be present due to limitations of this technology and occasionally words are not transcribed correctly.        Zora Simmonds, MD  12/18/22 5938

## 2024-04-12 ENCOUNTER — HOSPITAL ENCOUNTER (EMERGENCY)
Age: 29
Discharge: HOME | End: 2024-04-12
Attending: EMERGENCY MEDICINE

## 2024-04-12 ENCOUNTER — APPOINTMENT (OUTPATIENT)
Dept: GENERAL RADIOLOGY | Age: 29
End: 2024-04-12

## 2024-04-12 VITALS
HEART RATE: 80 BPM | SYSTOLIC BLOOD PRESSURE: 116 MMHG | OXYGEN SATURATION: 96 % | BODY MASS INDEX: 21.02 KG/M2 | HEIGHT: 68 IN | TEMPERATURE: 97.8 F | RESPIRATION RATE: 16 BRPM | WEIGHT: 138.7 LBS | DIASTOLIC BLOOD PRESSURE: 64 MMHG

## 2024-04-12 DIAGNOSIS — R07.89 CHEST WALL PAIN: Primary | ICD-10-CM

## 2024-04-12 DIAGNOSIS — Z72.0 TOBACCO USE: ICD-10-CM

## 2024-04-12 LAB
EKG ATRIAL RATE: 74 BPM
EKG DIAGNOSIS: NORMAL
EKG P AXIS: 63 DEGREES
EKG P-R INTERVAL: 132 MS
EKG Q-T INTERVAL: 396 MS
EKG QRS DURATION: 88 MS
EKG QTC CALCULATION (BAZETT): 439 MS
EKG R AXIS: 70 DEGREES
EKG T AXIS: 62 DEGREES
EKG VENTRICULAR RATE: 74 BPM

## 2024-04-12 PROCEDURE — 93005 ELECTROCARDIOGRAM TRACING: CPT | Performed by: EMERGENCY MEDICINE

## 2024-04-12 PROCEDURE — 71046 X-RAY EXAM CHEST 2 VIEWS: CPT

## 2024-04-12 PROCEDURE — 93010 ELECTROCARDIOGRAM REPORT: CPT | Performed by: INTERNAL MEDICINE

## 2024-04-12 PROCEDURE — 99284 EMERGENCY DEPT VISIT MOD MDM: CPT

## 2024-04-12 RX ORDER — NAPROXEN 500 MG/1
500 TABLET ORAL 2 TIMES DAILY PRN
Qty: 20 TABLET | Refills: 0 | Status: SHIPPED | OUTPATIENT
Start: 2024-04-12 | End: 2024-04-22

## 2024-04-12 ASSESSMENT — ENCOUNTER SYMPTOMS
DIARRHEA: 0
BLOOD IN STOOL: 0
ABDOMINAL PAIN: 0
EYE REDNESS: 0
EYE ITCHING: 0
PHOTOPHOBIA: 0
FACIAL SWELLING: 0
TROUBLE SWALLOWING: 0
SINUS PRESSURE: 0
CHEST TIGHTNESS: 0
CONSTIPATION: 0
RHINORRHEA: 0
ABDOMINAL DISTENTION: 0
ANAL BLEEDING: 0
COUGH: 0
EYE DISCHARGE: 0
STRIDOR: 0
WHEEZING: 0
BACK PAIN: 0
VOMITING: 0
VOICE CHANGE: 0
SHORTNESS OF BREATH: 0
NAUSEA: 0
EYE PAIN: 0

## 2024-04-12 ASSESSMENT — PAIN SCALES - GENERAL: PAINLEVEL_OUTOF10: 6

## 2024-04-12 ASSESSMENT — PAIN DESCRIPTION - DESCRIPTORS: DESCRIPTORS: PRESSURE

## 2024-04-12 ASSESSMENT — PAIN - FUNCTIONAL ASSESSMENT: PAIN_FUNCTIONAL_ASSESSMENT: 0-10

## 2024-04-12 ASSESSMENT — PAIN DESCRIPTION - LOCATION: LOCATION: CHEST

## 2024-04-12 NOTE — ED PROVIDER NOTES
Macario Ramsey is a 28 year old male who presents to the ED with chest pain. He states that he was sitting on the couch last evening watching TV and he he stretched out his arms and chest. When he did this he heard a \"pop\" in the middle of his chest. Since that time he has had pain in the center of his chest which hurts with movement, touch, and deep breathing. He has no history of CAD. He denies trauma. He is a smoker, but denies FH, HTN, DM, high cholesterol. His heart score is 1. His pain is currently 6/10. Analgesia is offered.        /64   Pulse 80   Temp 97.8 °F (36.6 °C) (Oral)   Resp 16   Ht 1.727 m (5' 8\")   Wt 62.9 kg (138 lb 11.2 oz)   SpO2 96%   BMI 21.09 kg/m²     I have reviewed the following from the nursing documentation:      Prior to Admission medications    Medication Sig Start Date End Date Taking? Authorizing Provider   ibuprofen (IBU) 600 MG tablet Take 1 tablet by mouth every 6 hours as needed for Pain 9/1/21   Joe Mcfadden II, DO       Allergies as of 04/12/2024 - Fully Reviewed 04/12/2024   Allergen Reaction Noted    Penicillins Hives 07/10/2015       No past medical history on file.     Surgical History:   Past Surgical History:   Procedure Laterality Date    FRACTURE SURGERY      nasal fracture    HAND SURGERY      left    WISDOM TOOTH EXTRACTION          Family History:  No family history on file.    Social History     Socioeconomic History    Marital status: Single     Spouse name: Not on file    Number of children: Not on file    Years of education: Not on file    Highest education level: Not on file   Occupational History    Not on file   Tobacco Use    Smoking status: Every Day     Current packs/day: 1.00     Types: Cigarettes    Smokeless tobacco: Never   Substance and Sexual Activity    Alcohol use: No    Drug use: No    Sexual activity: Not Currently     Partners: Female   Other Topics Concern    Not on file   Social History Narrative    Not on file     Social  shortness of breath, vomiting) that necessitate immediate return.     FINAL Impression    1. Chest wall pain    2. Tobacco use        Blood pressure 116/64, pulse 80, temperature 97.8 °F (36.6 °C), temperature source Oral, resp. rate 16, height 1.727 m (5' 8\"), weight 62.9 kg (138 lb 11.2 oz), SpO2 96 %.      Radiology  XR CHEST (2 VW)    Result Date: 4/12/2024  No acute cardiopulmonary abnormality.       EKG Interpretation.  The Ekg interpreted by me in the absence of a cardiologist shows.  normal sinus rhythm with a rate of 74  Axis is   Normal  QTc is  within an acceptable range  Intervals and Durations are unremarkable.      No specific ST-T wave changes appreciated.  No evidence of acute ischemia.   No old EKGs available for comparison. .       Lori Cantor MD  04/12/24 0814       Lori Cantor MD  04/12/24 0844

## 2024-05-01 ENCOUNTER — HOSPITAL ENCOUNTER (EMERGENCY)
Age: 29
Discharge: HOME | End: 2024-05-02
Attending: EMERGENCY MEDICINE

## 2024-05-01 ENCOUNTER — APPOINTMENT (OUTPATIENT)
Dept: CT IMAGING | Age: 29
End: 2024-05-01

## 2024-05-01 DIAGNOSIS — N20.0 KIDNEY STONE: Primary | ICD-10-CM

## 2024-05-01 LAB
ALBUMIN SERPL-MCNC: 4.6 G/DL (ref 3.4–5)
ALBUMIN/GLOB SERPL: 1.7 {RATIO} (ref 1.1–2.2)
ALP SERPL-CCNC: 89 U/L (ref 40–129)
ALT SERPL-CCNC: 17 U/L (ref 10–40)
ANION GAP SERPL CALCULATED.3IONS-SCNC: 12 MMOL/L (ref 3–16)
AST SERPL-CCNC: 18 U/L (ref 15–37)
BASOPHILS # BLD: 0.2 K/UL (ref 0–0.2)
BASOPHILS NFR BLD: 1.3 %
BILIRUB SERPL-MCNC: 0.3 MG/DL (ref 0–1)
BUN SERPL-MCNC: 13 MG/DL (ref 7–20)
CALCIUM SERPL-MCNC: 9.8 MG/DL (ref 8.3–10.6)
CHLORIDE SERPL-SCNC: 102 MMOL/L (ref 99–110)
CO2 SERPL-SCNC: 25 MMOL/L (ref 21–32)
CREAT SERPL-MCNC: 1.1 MG/DL (ref 0.9–1.3)
DEPRECATED RDW RBC AUTO: 13.4 % (ref 12.4–15.4)
EOSINOPHIL # BLD: 0.4 K/UL (ref 0–0.6)
EOSINOPHIL NFR BLD: 2.3 %
GFR SERPLBLD CREATININE-BSD FMLA CKD-EPI: >90 ML/MIN/{1.73_M2}
GLUCOSE SERPL-MCNC: 128 MG/DL (ref 70–99)
HCT VFR BLD AUTO: 42.3 % (ref 40.5–52.5)
HGB BLD-MCNC: 14.7 G/DL (ref 13.5–17.5)
LIPASE SERPL-CCNC: 22 U/L (ref 13–60)
LYMPHOCYTES # BLD: 2.9 K/UL (ref 1–5.1)
LYMPHOCYTES NFR BLD: 19.2 %
MCH RBC QN AUTO: 29.2 PG (ref 26–34)
MCHC RBC AUTO-ENTMCNC: 34.7 G/DL (ref 31–36)
MCV RBC AUTO: 84.1 FL (ref 80–100)
MONOCYTES # BLD: 0.9 K/UL (ref 0–1.3)
MONOCYTES NFR BLD: 5.8 %
NEUTROPHILS # BLD: 10.7 K/UL (ref 1.7–7.7)
NEUTROPHILS NFR BLD: 71.4 %
PLATELET # BLD AUTO: 290 K/UL (ref 135–450)
PMV BLD AUTO: 7.7 FL (ref 5–10.5)
POTASSIUM SERPL-SCNC: 3.9 MMOL/L (ref 3.5–5.1)
PROT SERPL-MCNC: 7.3 G/DL (ref 6.4–8.2)
RBC # BLD AUTO: 5.02 M/UL (ref 4.2–5.9)
SODIUM SERPL-SCNC: 139 MMOL/L (ref 136–145)
WBC # BLD AUTO: 15 K/UL (ref 4–11)

## 2024-05-01 PROCEDURE — 96374 THER/PROPH/DIAG INJ IV PUSH: CPT

## 2024-05-01 PROCEDURE — 83690 ASSAY OF LIPASE: CPT

## 2024-05-01 PROCEDURE — 85025 COMPLETE CBC W/AUTO DIFF WBC: CPT

## 2024-05-01 PROCEDURE — 99285 EMERGENCY DEPT VISIT HI MDM: CPT

## 2024-05-01 PROCEDURE — 6360000004 HC RX CONTRAST MEDICATION: Performed by: EMERGENCY MEDICINE

## 2024-05-01 PROCEDURE — 80053 COMPREHEN METABOLIC PANEL: CPT

## 2024-05-01 PROCEDURE — 96375 TX/PRO/DX INJ NEW DRUG ADDON: CPT

## 2024-05-01 PROCEDURE — 74177 CT ABD & PELVIS W/CONTRAST: CPT

## 2024-05-01 PROCEDURE — 2580000003 HC RX 258: Performed by: EMERGENCY MEDICINE

## 2024-05-01 PROCEDURE — 6360000002 HC RX W HCPCS: Performed by: EMERGENCY MEDICINE

## 2024-05-01 RX ORDER — KETOROLAC TROMETHAMINE 30 MG/ML
30 INJECTION, SOLUTION INTRAMUSCULAR; INTRAVENOUS ONCE
Status: COMPLETED | OUTPATIENT
Start: 2024-05-01 | End: 2024-05-01

## 2024-05-01 RX ORDER — 0.9 % SODIUM CHLORIDE 0.9 %
1000 INTRAVENOUS SOLUTION INTRAVENOUS ONCE
Status: COMPLETED | OUTPATIENT
Start: 2024-05-01 | End: 2024-05-02

## 2024-05-01 RX ORDER — ONDANSETRON 2 MG/ML
4 INJECTION INTRAMUSCULAR; INTRAVENOUS ONCE
Status: COMPLETED | OUTPATIENT
Start: 2024-05-01 | End: 2024-05-01

## 2024-05-01 RX ADMIN — HYDROMORPHONE HYDROCHLORIDE 0.5 MG: 1 INJECTION, SOLUTION INTRAMUSCULAR; INTRAVENOUS; SUBCUTANEOUS at 23:24

## 2024-05-01 RX ADMIN — ONDANSETRON 4 MG: 2 INJECTION INTRAMUSCULAR; INTRAVENOUS at 23:24

## 2024-05-01 RX ADMIN — SODIUM CHLORIDE 1000 ML: 9 INJECTION, SOLUTION INTRAVENOUS at 23:23

## 2024-05-01 RX ADMIN — KETOROLAC TROMETHAMINE 30 MG: 30 INJECTION, SOLUTION INTRAMUSCULAR; INTRAVENOUS at 23:24

## 2024-05-01 RX ADMIN — IOPAMIDOL 75 ML: 755 INJECTION, SOLUTION INTRAVENOUS at 23:57

## 2024-05-01 ASSESSMENT — PAIN DESCRIPTION - LOCATION: LOCATION: ABDOMEN;FLANK

## 2024-05-01 ASSESSMENT — PAIN DESCRIPTION - PAIN TYPE: TYPE: ACUTE PAIN

## 2024-05-01 ASSESSMENT — PAIN DESCRIPTION - DESCRIPTORS: DESCRIPTORS: SHARP

## 2024-05-01 ASSESSMENT — PAIN DESCRIPTION - FREQUENCY: FREQUENCY: CONTINUOUS

## 2024-05-01 ASSESSMENT — PAIN DESCRIPTION - ONSET: ONSET: ON-GOING

## 2024-05-01 ASSESSMENT — PAIN SCALES - GENERAL: PAINLEVEL_OUTOF10: 10

## 2024-05-02 VITALS
WEIGHT: 135 LBS | HEIGHT: 69 IN | HEART RATE: 73 BPM | OXYGEN SATURATION: 100 % | DIASTOLIC BLOOD PRESSURE: 64 MMHG | TEMPERATURE: 97.4 F | SYSTOLIC BLOOD PRESSURE: 122 MMHG | RESPIRATION RATE: 22 BRPM | BODY MASS INDEX: 19.99 KG/M2

## 2024-05-02 LAB
AMORPH SED URNS QL MICRO: ABNORMAL /HPF
BACTERIA URNS QL MICRO: ABNORMAL /HPF
BILIRUB UR QL STRIP.AUTO: NEGATIVE
CLARITY UR: ABNORMAL
COLOR UR: YELLOW
EPI CELLS #/AREA URNS HPF: ABNORMAL /HPF (ref 0–5)
GLUCOSE UR STRIP.AUTO-MCNC: NEGATIVE MG/DL
HGB UR QL STRIP.AUTO: ABNORMAL
KETONES UR STRIP.AUTO-MCNC: NEGATIVE MG/DL
LEUKOCYTE ESTERASE UR QL STRIP.AUTO: NEGATIVE
NITRITE UR QL STRIP.AUTO: NEGATIVE
PH UR STRIP.AUTO: 7.5 [PH] (ref 5–8)
PROT UR STRIP.AUTO-MCNC: ABNORMAL MG/DL
RBC #/AREA URNS HPF: ABNORMAL /HPF (ref 0–4)
SP GR UR STRIP.AUTO: 1.01 (ref 1–1.03)
UA COMPLETE W REFLEX CULTURE PNL UR: ABNORMAL
UA DIPSTICK W REFLEX MICRO PNL UR: YES
URN SPEC COLLECT METH UR: ABNORMAL
UROBILINOGEN UR STRIP-ACNC: 0.2 E.U./DL
WBC #/AREA URNS HPF: ABNORMAL /HPF (ref 0–5)

## 2024-05-02 PROCEDURE — 81001 URINALYSIS AUTO W/SCOPE: CPT

## 2024-05-02 PROCEDURE — 2580000003 HC RX 258: Performed by: EMERGENCY MEDICINE

## 2024-05-02 PROCEDURE — 96375 TX/PRO/DX INJ NEW DRUG ADDON: CPT

## 2024-05-02 PROCEDURE — 6360000002 HC RX W HCPCS: Performed by: EMERGENCY MEDICINE

## 2024-05-02 RX ORDER — ONDANSETRON 2 MG/ML
4 INJECTION INTRAMUSCULAR; INTRAVENOUS ONCE
Status: COMPLETED | OUTPATIENT
Start: 2024-05-02 | End: 2024-05-02

## 2024-05-02 RX ORDER — NAPROXEN 500 MG/1
500 TABLET ORAL 2 TIMES DAILY
Qty: 20 TABLET | Refills: 0 | Status: SHIPPED | OUTPATIENT
Start: 2024-05-02 | End: 2024-05-12

## 2024-05-02 RX ORDER — ONDANSETRON 4 MG/1
4 TABLET, ORALLY DISINTEGRATING ORAL 3 TIMES DAILY PRN
Qty: 21 TABLET | Refills: 0 | Status: SHIPPED | OUTPATIENT
Start: 2024-05-02

## 2024-05-02 RX ORDER — 0.9 % SODIUM CHLORIDE 0.9 %
1000 INTRAVENOUS SOLUTION INTRAVENOUS ONCE
Status: COMPLETED | OUTPATIENT
Start: 2024-05-02 | End: 2024-05-02

## 2024-05-02 RX ORDER — OXYCODONE HYDROCHLORIDE AND ACETAMINOPHEN 5; 325 MG/1; MG/1
1-2 TABLET ORAL EVERY 6 HOURS PRN
Qty: 8 TABLET | Refills: 0 | Status: SHIPPED | OUTPATIENT
Start: 2024-05-02 | End: 2024-05-09

## 2024-05-02 RX ADMIN — SODIUM CHLORIDE 1000 ML: 9 INJECTION, SOLUTION INTRAVENOUS at 00:30

## 2024-05-02 RX ADMIN — ONDANSETRON 4 MG: 2 INJECTION INTRAMUSCULAR; INTRAVENOUS at 00:30

## 2024-05-02 NOTE — ED NOTES
Pt discharged to home. 3 prescriptions given. Discharge paperwork discussed. All questions and concerns answered at this time. Pt awake and alert. Respirations even and unlabored.

## 2024-05-02 NOTE — ED PROVIDER NOTES
[facility] EMERGENCY DEPARTMENT  eMERGENCY dEPARTMENT eNCOUnter      Pt Name: Macario Ramsey  MRN: [id number]  Birthdate 1995  Date of evaluation: 5/1/2024  Provider: Perry De Anda MD  PCP: No primary care provider on file.      CHIEF COMPLAINT       Emesis and back pain    HISTORY OFPREST ILLNESS   (Location/Symptom, Timing/Onset, Context/Setting, Quality, Duration, Modifying Factors,Severity)  Note limiting factors.     Macario Ramsey is a 28 y.o. male presents with complaints of right-sided back and abdominal pain and nausea he says it started about 45 minutes ago denies any history of kidney stones.  Denies any fever or chills or DrThomas Flood    Nursing Notes were all reviewed and agreed with or any disagreements were addressed  in the HPI.    REVIEW OF SYSTEMS    (2-9 systems for level 4, 10 or more for level 5)     Review of Systems    Positives and Pertinent negatives as per HPI.  Except as noted above in the ROS, all other systems were reviewed andnegative.       PASTMEDICAL HISTORY   No past medical history on file.      SURGICAL HISTORY       Past Surgical History:   Procedure Laterality Date    FRACTURE SURGERY      nasal fracture    HAND SURGERY      left    WISDOM TOOTH EXTRACTION           CURRENT MEDICATIONS       Discharge Medication List as of 5/2/2024  1:13 AM          ALLERGIES     Penicillins    FAMILY HISTORY     No family history on file.       SOCIAL HISTORY       Social History     Socioeconomic History    Marital status: Single   Tobacco Use    Smoking status: Every Day     Current packs/day: 1.00     Types: Cigarettes    Smokeless tobacco: Never   Substance and Sexual Activity    Alcohol use: No    Drug use: No    Sexual activity: Not Currently     Partners: Female       SCREENINGS    Fiorella Coma Scale  Eye Opening: Spontaneous  Best Verbal Response: Oriented  Best Motor Response: Obeys commands  [place] Coma Scale Score: 15 @FLOW([id number])@      PHYSICAL EXAM    (up

## 2024-05-11 ENCOUNTER — HOSPITAL ENCOUNTER (EMERGENCY)
Age: 29
Discharge: HOME | End: 2024-05-11
Attending: EMERGENCY MEDICINE

## 2024-05-11 VITALS
RESPIRATION RATE: 16 BRPM | SYSTOLIC BLOOD PRESSURE: 123 MMHG | DIASTOLIC BLOOD PRESSURE: 80 MMHG | BODY MASS INDEX: 20.29 KG/M2 | OXYGEN SATURATION: 99 % | TEMPERATURE: 98.4 F | WEIGHT: 137.4 LBS | HEART RATE: 72 BPM

## 2024-05-11 DIAGNOSIS — K02.9 DENTAL DECAY: Primary | ICD-10-CM

## 2024-05-11 DIAGNOSIS — K04.7 DENTAL INFECTION: ICD-10-CM

## 2024-05-11 PROCEDURE — 99283 EMERGENCY DEPT VISIT LOW MDM: CPT

## 2024-05-11 PROCEDURE — 6370000000 HC RX 637 (ALT 250 FOR IP): Performed by: EMERGENCY MEDICINE

## 2024-05-11 RX ORDER — LEVOFLOXACIN 500 MG/1
500 TABLET, FILM COATED ORAL DAILY
Qty: 10 TABLET | Refills: 0 | Status: SHIPPED | OUTPATIENT
Start: 2024-05-11 | End: 2024-05-21

## 2024-05-11 RX ORDER — METRONIDAZOLE 500 MG/1
500 TABLET ORAL ONCE
Status: COMPLETED | OUTPATIENT
Start: 2024-05-11 | End: 2024-05-11

## 2024-05-11 RX ORDER — LEVOFLOXACIN 500 MG/1
500 TABLET, FILM COATED ORAL ONCE
Status: COMPLETED | OUTPATIENT
Start: 2024-05-11 | End: 2024-05-11

## 2024-05-11 RX ORDER — METRONIDAZOLE 500 MG/1
500 TABLET ORAL 3 TIMES DAILY
Qty: 30 TABLET | Refills: 0 | Status: SHIPPED | OUTPATIENT
Start: 2024-05-11 | End: 2024-05-21

## 2024-05-11 RX ORDER — CHLORHEXIDINE GLUCONATE ORAL RINSE 1.2 MG/ML
15 SOLUTION DENTAL 2 TIMES DAILY
Qty: 420 ML | Refills: 0 | Status: SHIPPED | OUTPATIENT
Start: 2024-05-11 | End: 2024-05-25

## 2024-05-11 RX ADMIN — LEVOFLOXACIN 500 MG: 500 TABLET, FILM COATED ORAL at 22:19

## 2024-05-11 RX ADMIN — METRONIDAZOLE 500 MG: 500 TABLET ORAL at 22:19

## 2024-05-11 ASSESSMENT — PAIN SCALES - GENERAL: PAINLEVEL_OUTOF10: 9

## 2024-05-11 ASSESSMENT — PAIN DESCRIPTION - LOCATION: LOCATION: TEETH

## 2024-05-11 ASSESSMENT — PAIN - FUNCTIONAL ASSESSMENT: PAIN_FUNCTIONAL_ASSESSMENT: 0-10

## 2024-05-11 ASSESSMENT — PAIN DESCRIPTION - ORIENTATION: ORIENTATION: LEFT;LOWER

## 2024-05-12 NOTE — DISCHARGE INSTRUCTIONS
Tylenol and/or motrin for pain. Return for fever, severe headache, increased trouble swallowing, trouble breathing, increased facial swelling, purulent drainage

## 2024-05-12 NOTE — ED PROVIDER NOTES
[facility] EMERGENCY DEPT VISIT      Patient Identification  Macario Ramsey is a 28 y.o. male.    Chief Complaint   Dental Pain (Has dental appt Monday at 3p. Was referred to ER to get ABX prior to appt. Has been taking lots of Ibuprofen. Pain to left lower posterior. States he is unable to open mouth very wide due to pain. )      History of Present Illness:    History was obtained from patient.  Limitations to history:none  This is a  28 y.o. male who presents ambulatory  to the ED with complaints of left lower dental pain and jaw pain for last 3-4 days. He has had broken tooth for a while but pain increased recently. Has appt with dentist on Monday but was told to get antibiotics so that swelling may decrease to get better exam. No fever. No trouble swallowing but has trouble opening his mouth due to pain. Had been on percocet for kidney stone, now taking motrin for pain. .     History reviewed. No pertinent past medical history.    Past Surgical History:   Procedure Laterality Date    FRACTURE SURGERY      nasal fracture    HAND SURGERY      left    WISDOM TOOTH EXTRACTION         No current facility-administered medications for this encounter.    Current Outpatient Medications:     metroNIDAZOLE (FLAGYL) 500 MG tablet, Take 1 tablet by mouth 3 times daily for 10 days, Disp: 30 tablet, Rfl: 0    levoFLOXacin (LEVAQUIN) 500 MG tablet, Take 1 tablet by mouth daily for 10 days, Disp: 10 tablet, Rfl: 0    chlorhexidine (PERIDEX) 0.12 % solution, Swish and spit 15 mLs 2 times daily for 14 days, Disp: 420 mL, Rfl: 0    ondansetron (ZOFRAN-ODT) 4 MG disintegrating tablet, Take 1 tablet by mouth 3 times daily as needed for Nausea or Vomiting, Disp: 21 tablet, Rfl: 0    naproxen (NAPROSYN) 500 MG tablet, Take 1 tablet by mouth 2 times daily for 20 doses, Disp: 20 tablet, Rfl: 0    Allergies   Allergen Reactions    Penicillins Hives       Social History     Socioeconomic History    Marital status: Single     Spouse name: Not

## 2024-07-01 ENCOUNTER — HOSPITAL ENCOUNTER (EMERGENCY)
Age: 29
Discharge: HOME | End: 2024-07-01

## 2024-07-01 VITALS
HEART RATE: 63 BPM | WEIGHT: 139.33 LBS | TEMPERATURE: 98 F | HEIGHT: 69 IN | BODY MASS INDEX: 20.64 KG/M2 | OXYGEN SATURATION: 98 % | DIASTOLIC BLOOD PRESSURE: 60 MMHG | RESPIRATION RATE: 16 BRPM | SYSTOLIC BLOOD PRESSURE: 109 MMHG

## 2024-07-01 DIAGNOSIS — S71.111A LACERATION OF RIGHT THIGH, INITIAL ENCOUNTER: Primary | ICD-10-CM

## 2024-07-01 PROCEDURE — 90715 TDAP VACCINE 7 YRS/> IM: CPT | Performed by: PHYSICIAN ASSISTANT

## 2024-07-01 PROCEDURE — 90471 IMMUNIZATION ADMIN: CPT | Performed by: PHYSICIAN ASSISTANT

## 2024-07-01 PROCEDURE — 6370000000 HC RX 637 (ALT 250 FOR IP): Performed by: PHYSICIAN ASSISTANT

## 2024-07-01 PROCEDURE — 6360000002 HC RX W HCPCS: Performed by: PHYSICIAN ASSISTANT

## 2024-07-01 PROCEDURE — 99284 EMERGENCY DEPT VISIT MOD MDM: CPT

## 2024-07-01 PROCEDURE — 12002 RPR S/N/AX/GEN/TRNK2.6-7.5CM: CPT

## 2024-07-01 RX ORDER — BACITRACIN ZINC AND POLYMYXIN B SULFATE 500; 1000 [USP'U]/G; [USP'U]/G
OINTMENT TOPICAL ONCE
Status: COMPLETED | OUTPATIENT
Start: 2024-07-01 | End: 2024-07-01

## 2024-07-01 RX ADMIN — TETANUS TOXOID, REDUCED DIPHTHERIA TOXOID AND ACELLULAR PERTUSSIS VACCINE, ADSORBED 0.5 ML: 5; 2.5; 8; 8; 2.5 SUSPENSION INTRAMUSCULAR at 18:05

## 2024-07-01 RX ADMIN — Medication: at 18:31

## 2024-07-01 NOTE — ED PROVIDER NOTES
[facility] EMERGENCY DEPARTMENT  EMERGENCY DEPARTMENT ENCOUNTER      Pt Name: Macario Ramsey  MRN: [id number]  Birthdate 1995  Date of evaluation: 7/1/2024  Provider: AALIYAH Nagel  PCP: No primary care provider on file.  Note Started: 5:36 PM EDT     The ED Attending Physician was available for consultation but did not see or evaluate this patient.    CHIEF COMPLAINT       Chief Complaint   Patient presents with    Laceration     Right thigh       HISTORY OF PRESENT ILLNESS   (Location, Timing/Onset, Context/Setting, Quality, Duration, Modifying Factors, Severity, Associated Signs and Symptoms)  Note limiting factors.     Macario Ramsey is a 29 y.o. male who presents with complaint of laceration to the right upper leg.  He says he was using a  about an hour before he came to the ED and the blade accidentally hit his upper leg and cut him.  Went through his pantleg. Says there was some bleeding but it stopped.  He says he had just put a new blade in the cutter, so it was not very dirty.  Says he still able to bend and straighten out his knee without difficulty.  Denies injuries to any other parts of the body.  No relevant medical problems. Can't recall when was his last tetanus vaccination.    Nursing Notes were all reviewed and agreed with or any disagreements were addressed in the HPI.    REVIEW OF SYSTEMS    (2-9 systems for level 4, 10 or more for level 5)     Positives and pertinent negatives as per HPI.     PAST MEDICAL HISTORY   History reviewed. No pertinent past medical history.    SURGICAL HISTORY     Past Surgical History:   Procedure Laterality Date    FRACTURE SURGERY      nasal fracture    HAND SURGERY      left    WISDOM TOOTH EXTRACTION         CURRENTMEDICATIONS       Previous Medications    NAPROXEN (NAPROSYN) 500 MG TABLET    Take 1 tablet by mouth 2 times daily for 20 doses    ONDANSETRON (ZOFRAN-ODT) 4 MG DISINTEGRATING TABLET    Take 1 tablet by mouth 3 times daily

## 2024-07-01 NOTE — DISCHARGE INSTRUCTIONS
Topical antibiotic ointment is not needed after today. For the next few days, wash the affected area normally with soap and water but avoid submerging it and keep the stitches covered when not being cleaned. The stitches will dissolve on their own as the wound heals and do not need to be removed. Return to the emergency department if you should experience signs of infection such as fever, increased redness and swelling, or pus discharge at the site of the sutures.

## 2024-07-01 NOTE — ED TRIAGE NOTES
30y/o male presents to the ED with laceration to right leg above knee. Bleeding controlled. Unknown tdap. Pt states he accidentally cut his leg with a razor blade.